# Patient Record
Sex: FEMALE | Race: WHITE | Employment: OTHER | URBAN - METROPOLITAN AREA
[De-identification: names, ages, dates, MRNs, and addresses within clinical notes are randomized per-mention and may not be internally consistent; named-entity substitution may affect disease eponyms.]

---

## 2017-11-17 ENCOUNTER — HOSPITAL ENCOUNTER (OUTPATIENT)
Dept: MAMMOGRAPHY | Age: 62
Discharge: HOME | End: 2017-11-17
Attending: INTERNAL MEDICINE | Admitting: INTERNAL MEDICINE

## 2017-11-17 DIAGNOSIS — Z12.31 VISIT FOR SCREENING MAMMOGRAM: ICD-10-CM

## 2017-11-22 ENCOUNTER — HOSPITAL ENCOUNTER (OUTPATIENT)
Dept: MAMMOGRAPHY | Age: 62
Discharge: HOME | End: 2017-11-22
Attending: INTERNAL MEDICINE | Admitting: INTERNAL MEDICINE

## 2017-11-22 DIAGNOSIS — R92.8 ABNORMAL FINDING ON MAMMOGRAPHY: ICD-10-CM

## 2018-09-27 ENCOUNTER — OFFICE VISIT (OUTPATIENT)
Dept: ORTHOPEDIC SURGERY | Age: 63
End: 2018-09-27
Payer: COMMERCIAL

## 2018-09-27 VITALS
HEART RATE: 70 BPM | BODY MASS INDEX: 17.72 KG/M2 | DIASTOLIC BLOOD PRESSURE: 60 MMHG | SYSTOLIC BLOOD PRESSURE: 110 MMHG | WEIGHT: 100 LBS | HEIGHT: 63 IN

## 2018-09-27 DIAGNOSIS — M65.9 TENOSYNOVITIS OF FOOT: Primary | ICD-10-CM

## 2018-09-27 PROBLEM — R62.7 FAILURE TO THRIVE IN ADULT: Status: ACTIVE | Noted: 2018-05-29

## 2018-09-27 PROBLEM — B37.31 CANDIDIASIS OF VAGINA: Status: ACTIVE | Noted: 2018-09-27

## 2018-09-27 PROBLEM — K60.3 ANAL FISTULA: Status: ACTIVE | Noted: 2018-01-16

## 2018-09-27 PROBLEM — K60.30 ANAL FISTULA: Status: ACTIVE | Noted: 2018-01-16

## 2018-09-27 PROBLEM — M65.979 TENOSYNOVITIS OF FOOT: Status: ACTIVE | Noted: 2018-09-27

## 2018-09-27 PROBLEM — R62.51 FAILURE TO THRIVE (0-17): Status: ACTIVE | Noted: 2018-05-29

## 2018-09-27 PROBLEM — K50.10 CROHN'S DISEASE OF ANAL CANAL (HCC): Status: ACTIVE | Noted: 2018-01-16

## 2018-09-27 PROBLEM — K50.90 REGIONAL ENTERITIS (HCC): Status: ACTIVE | Noted: 2018-09-27

## 2018-09-27 PROCEDURE — L3040 FT ARCH SUPRT PREMOLD LONGIT: HCPCS | Performed by: PODIATRIST

## 2018-09-27 PROCEDURE — 99203 OFFICE O/P NEW LOW 30 MIN: CPT | Performed by: PODIATRIST

## 2018-09-27 RX ORDER — LEVOFLOXACIN 750 MG/1
TABLET ORAL
COMMUNITY

## 2018-09-27 RX ORDER — FLUCONAZOLE 150 MG/1
TABLET ORAL
COMMUNITY

## 2018-09-27 RX ORDER — NAPROXEN SODIUM 220 MG
TABLET ORAL
COMMUNITY
End: 2020-06-15

## 2018-09-27 RX ORDER — CEPHALEXIN 500 MG/1
CAPSULE ORAL
COMMUNITY
End: 2020-06-15

## 2018-09-27 RX ORDER — CLINDAMYCIN HYDROCHLORIDE 300 MG/1
CAPSULE ORAL
Refills: 0 | COMMUNITY
Start: 2018-07-16 | End: 2020-06-15

## 2018-09-27 RX ORDER — FAMCICLOVIR 500 MG/1
TABLET, FILM COATED ORAL
Refills: 1 | COMMUNITY
Start: 2018-07-02 | End: 2020-06-15

## 2018-09-27 RX ORDER — CEFUROXIME AXETIL 500 MG/1
TABLET ORAL
COMMUNITY
End: 2020-06-15

## 2018-09-27 RX ORDER — FLUCONAZOLE 100 MG/1
TABLET ORAL
Refills: 0 | COMMUNITY
Start: 2018-07-16 | End: 2020-06-15

## 2018-09-27 NOTE — PROGRESS NOTES
essentially an overuse type injury secondary to the activity level, foot function, and weight. I discussed the recurrent nature of the episodes of pain and chronic nature of this problem. It was probably the relative increase activity after her surgery that led to the foot symptoms. Both short and long term treatment was discussed. I will see her back as needed. Procedures    Powerstep Protech Full Length Insert     Patient was prescribed Powerstep Protech Full Length Inserts. The bilateral foot will require stabilization / support from this semi-rigid / rigid orthosis to improve their function. The orthosis will assist in protecting the affected area, provide functional support and facilitate healing. The patient was educated and fit by a healthcare professional with expert knowledge and specialization in brace application while under the direct supervision of the treating physician. Verbal and written instructions for the use of and application of this item were provided. They were instructed to contact the office immediately should the brace result in increased pain, decreased sensation, increased swelling or worsening of the condition.

## 2018-11-28 ENCOUNTER — HOSPITAL ENCOUNTER (OUTPATIENT)
Dept: MAMMOGRAPHY | Age: 63
Discharge: HOME | End: 2018-11-28
Payer: COMMERCIAL

## 2018-11-28 DIAGNOSIS — Z12.31 VISIT FOR SCREENING MAMMOGRAM: ICD-10-CM

## 2018-11-28 PROCEDURE — 77067 SCR MAMMO BI INCL CAD: CPT

## 2019-10-28 ENCOUNTER — OFFICE VISIT (OUTPATIENT)
Dept: ORTHOPEDIC SURGERY | Age: 64
End: 2019-10-28
Payer: COMMERCIAL

## 2019-10-28 VITALS — HEIGHT: 63 IN | BODY MASS INDEX: 17.73 KG/M2 | WEIGHT: 100.09 LBS

## 2019-10-28 DIAGNOSIS — M70.61 GREATER TROCHANTERIC BURSITIS OF RIGHT HIP: Primary | ICD-10-CM

## 2019-10-28 DIAGNOSIS — M25.551 HIP PAIN, RIGHT: ICD-10-CM

## 2019-10-28 PROCEDURE — 99213 OFFICE O/P EST LOW 20 MIN: CPT | Performed by: ORTHOPAEDIC SURGERY

## 2019-11-07 ENCOUNTER — HOSPITAL ENCOUNTER (OUTPATIENT)
Dept: PHYSICAL THERAPY | Age: 64
Setting detail: THERAPIES SERIES
Discharge: HOME | End: 2019-11-07
Payer: COMMERCIAL

## 2019-11-07 PROCEDURE — 97161 PT EVAL LOW COMPLEX 20 MIN: CPT | Performed by: PHYSICAL THERAPIST

## 2019-11-07 PROCEDURE — 97140 MANUAL THERAPY 1/> REGIONS: CPT | Performed by: PHYSICAL THERAPIST

## 2019-11-07 PROCEDURE — 97110 THERAPEUTIC EXERCISES: CPT | Performed by: PHYSICAL THERAPIST

## 2019-11-14 ENCOUNTER — HOSPITAL ENCOUNTER (OUTPATIENT)
Dept: PHYSICAL THERAPY | Age: 64
Setting detail: THERAPIES SERIES
Discharge: HOME | End: 2019-11-14
Payer: COMMERCIAL

## 2019-11-14 PROCEDURE — 97110 THERAPEUTIC EXERCISES: CPT | Performed by: PHYSICAL THERAPIST

## 2019-11-14 PROCEDURE — 97140 MANUAL THERAPY 1/> REGIONS: CPT | Performed by: PHYSICAL THERAPIST

## 2019-11-21 ENCOUNTER — HOSPITAL ENCOUNTER (OUTPATIENT)
Dept: PHYSICAL THERAPY | Age: 64
Setting detail: THERAPIES SERIES
Discharge: HOME | End: 2019-11-21
Payer: COMMERCIAL

## 2019-11-21 PROCEDURE — 97140 MANUAL THERAPY 1/> REGIONS: CPT | Performed by: PHYSICAL THERAPIST

## 2019-11-21 PROCEDURE — 97110 THERAPEUTIC EXERCISES: CPT | Performed by: PHYSICAL THERAPIST

## 2019-11-29 ENCOUNTER — HOSPITAL ENCOUNTER (OUTPATIENT)
Dept: MAMMOGRAPHY | Age: 64
Discharge: HOME | End: 2019-11-29
Payer: COMMERCIAL

## 2019-11-29 DIAGNOSIS — Z12.31 VISIT FOR SCREENING MAMMOGRAM: ICD-10-CM

## 2019-11-29 PROCEDURE — 77063 BREAST TOMOSYNTHESIS BI: CPT

## 2019-12-02 ENCOUNTER — HOSPITAL ENCOUNTER (OUTPATIENT)
Dept: PHYSICAL THERAPY | Age: 64
Setting detail: THERAPIES SERIES
Discharge: HOME | End: 2019-12-02
Payer: COMMERCIAL

## 2019-12-02 PROCEDURE — 97140 MANUAL THERAPY 1/> REGIONS: CPT | Performed by: PHYSICAL THERAPIST

## 2019-12-02 PROCEDURE — 97110 THERAPEUTIC EXERCISES: CPT | Performed by: PHYSICAL THERAPIST

## 2020-06-15 ENCOUNTER — OFFICE VISIT (OUTPATIENT)
Dept: ORTHOPEDIC SURGERY | Age: 65
End: 2020-06-15
Payer: MEDICARE

## 2020-06-15 VITALS — WEIGHT: 100.09 LBS | BODY MASS INDEX: 17.73 KG/M2 | RESPIRATION RATE: 15 BRPM | HEIGHT: 63 IN

## 2020-06-15 PROCEDURE — 1036F TOBACCO NON-USER: CPT | Performed by: PODIATRIST

## 2020-06-15 PROCEDURE — G8400 PT W/DXA NO RESULTS DOC: HCPCS | Performed by: PODIATRIST

## 2020-06-15 PROCEDURE — 3017F COLORECTAL CA SCREEN DOC REV: CPT | Performed by: PODIATRIST

## 2020-06-15 PROCEDURE — G8427 DOCREV CUR MEDS BY ELIG CLIN: HCPCS | Performed by: PODIATRIST

## 2020-06-15 PROCEDURE — 4040F PNEUMOC VAC/ADMIN/RCVD: CPT | Performed by: PODIATRIST

## 2020-06-15 PROCEDURE — 1090F PRES/ABSN URINE INCON ASSESS: CPT | Performed by: PODIATRIST

## 2020-06-15 PROCEDURE — 1123F ACP DISCUSS/DSCN MKR DOCD: CPT | Performed by: PODIATRIST

## 2020-06-15 PROCEDURE — 99203 OFFICE O/P NEW LOW 30 MIN: CPT | Performed by: PODIATRIST

## 2020-06-15 PROCEDURE — G8419 CALC BMI OUT NRM PARAM NOF/U: HCPCS | Performed by: PODIATRIST

## 2020-06-30 ENCOUNTER — TELEPHONE (OUTPATIENT)
Dept: ORTHOPEDIC SURGERY | Age: 65
End: 2020-06-30

## 2020-06-30 ENCOUNTER — OFFICE VISIT (OUTPATIENT)
Dept: ORTHOPEDIC SURGERY | Age: 65
End: 2020-06-30
Payer: MEDICARE

## 2020-06-30 VITALS — HEIGHT: 63 IN | BODY MASS INDEX: 18.07 KG/M2 | WEIGHT: 102 LBS

## 2020-06-30 PROCEDURE — L4361 PNEUMA/VAC WALK BOOT PRE OTS: HCPCS | Performed by: PODIATRIST

## 2020-06-30 PROCEDURE — 99213 OFFICE O/P EST LOW 20 MIN: CPT | Performed by: PODIATRIST

## 2020-06-30 RX ORDER — METHYLPREDNISOLONE 4 MG/1
TABLET ORAL
Qty: 1 KIT | Refills: 0 | Status: SHIPPED | OUTPATIENT
Start: 2020-06-30

## 2020-06-30 NOTE — PROGRESS NOTES
HISTORY OF PRESENT ILLNESS:  This is a return visit for a patient with a chief complaint of pain to the side of the right ankle. Unfortunately, she is having more pain now. Within the last week she is increased pain to the point where she is having difficulty sleeping at night and now feels that she is limping. She cannot recall any direct injury. She went golfing once over the last few weeks but otherwise states that she has no point tenderness and decrease her overall activity. PHYSICAL EXAM:  The majority of the palpable tenderness is at the posterior lateral aspect of the right ankle along the peroneal tendons. She has mild erythema overlying the anterior aspect of the ankle and onto the dorsum of the foot. Demonstrates minimal edema however, she states that she has noticed a lot of swelling especially at night. There is full strength with eversion and plantarflexion of the foot, bilateral.    Pedal pulses are palpable, bilateral.  The sensation is grossly intact, bilateral.      ASSESSMENT: Peroneal Tendinitis, right       PLAN: A high tide walker was applied to the patient's right lower extremity. The patient was instructed to remove the boot prior to driving. The boot is to be replaced before the patient if he gets out of the car upon completion of driving. It was recommended that the patient use the boot even at night for sleeping. All weightbearing activity is to be performed in the boot with the exception of driving. Overall activity is to be decreased. I prescribed a Medrol Dosepak. We discussed the potential adverse reactions. I will see her back in 2 weeks. Procedures    Airselect Tall Pneumatic Walking Boot     Patient was prescribed a Sherryn Lever Tall Walking Boot. The right foot will require stabilization / immobilization from this semi-rigid / rigid orthosis to improve their function.   The orthosis will assist in protecting the affected area, provide functional support and

## 2020-07-14 ENCOUNTER — OFFICE VISIT (OUTPATIENT)
Dept: ORTHOPEDIC SURGERY | Age: 65
End: 2020-07-14
Payer: MEDICARE

## 2020-07-14 VITALS — WEIGHT: 102 LBS | BODY MASS INDEX: 18.07 KG/M2 | HEIGHT: 63 IN

## 2020-07-14 PROCEDURE — 3017F COLORECTAL CA SCREEN DOC REV: CPT | Performed by: PODIATRIST

## 2020-07-14 PROCEDURE — 1090F PRES/ABSN URINE INCON ASSESS: CPT | Performed by: PODIATRIST

## 2020-07-14 PROCEDURE — 4040F PNEUMOC VAC/ADMIN/RCVD: CPT | Performed by: PODIATRIST

## 2020-07-14 PROCEDURE — 1123F ACP DISCUSS/DSCN MKR DOCD: CPT | Performed by: PODIATRIST

## 2020-07-14 PROCEDURE — G8419 CALC BMI OUT NRM PARAM NOF/U: HCPCS | Performed by: PODIATRIST

## 2020-07-14 PROCEDURE — G8427 DOCREV CUR MEDS BY ELIG CLIN: HCPCS | Performed by: PODIATRIST

## 2020-07-14 PROCEDURE — 1036F TOBACCO NON-USER: CPT | Performed by: PODIATRIST

## 2020-07-14 PROCEDURE — 99213 OFFICE O/P EST LOW 20 MIN: CPT | Performed by: PODIATRIST

## 2020-07-14 PROCEDURE — G8400 PT W/DXA NO RESULTS DOC: HCPCS | Performed by: PODIATRIST

## 2020-07-14 NOTE — PROGRESS NOTES
HISTORY OF PRESENT ILLNESS:  This is a return visit for a patient with a chief complaint of pain to the side of the right ankle. She states that the boot has helped decrease a lot of her pain however, she still requiring Advil at night to get to sleep. PHYSICAL EXAM:  The majority of the palpable tenderness remains at the posterior lateral aspect of the right ankle along the peroneal tendons. There is no erythema, ecchymosis, or edema. There is full strength with eversion and plantarflexion of the foot, bilateral.    Pedal pulses are palpable, bilateral.  The sensation is grossly intact, bilateral.      ASSESSMENT: Peroneal Tendinitis, right       PLAN: I ordered MRI to rule out any structural problem with the peroneal tendons. In the meantime she will continue with the boot and decrease her overall activity. I will see her back after the MRI.

## 2020-08-04 ENCOUNTER — OFFICE VISIT (OUTPATIENT)
Dept: ORTHOPEDIC SURGERY | Age: 65
End: 2020-08-04
Payer: MEDICARE

## 2020-08-04 PROCEDURE — 4040F PNEUMOC VAC/ADMIN/RCVD: CPT | Performed by: PODIATRIST

## 2020-08-04 PROCEDURE — G8400 PT W/DXA NO RESULTS DOC: HCPCS | Performed by: PODIATRIST

## 2020-08-04 PROCEDURE — G8419 CALC BMI OUT NRM PARAM NOF/U: HCPCS | Performed by: PODIATRIST

## 2020-08-04 PROCEDURE — G8427 DOCREV CUR MEDS BY ELIG CLIN: HCPCS | Performed by: PODIATRIST

## 2020-08-04 PROCEDURE — 3017F COLORECTAL CA SCREEN DOC REV: CPT | Performed by: PODIATRIST

## 2020-08-04 PROCEDURE — 1123F ACP DISCUSS/DSCN MKR DOCD: CPT | Performed by: PODIATRIST

## 2020-08-04 PROCEDURE — 99213 OFFICE O/P EST LOW 20 MIN: CPT | Performed by: PODIATRIST

## 2020-08-04 PROCEDURE — 1036F TOBACCO NON-USER: CPT | Performed by: PODIATRIST

## 2020-08-04 PROCEDURE — 1090F PRES/ABSN URINE INCON ASSESS: CPT | Performed by: PODIATRIST

## 2020-08-18 ENCOUNTER — OFFICE VISIT (OUTPATIENT)
Dept: ORTHOPEDIC SURGERY | Age: 65
End: 2020-08-18
Payer: MEDICARE

## 2020-08-18 VITALS — WEIGHT: 102 LBS | HEIGHT: 63 IN | BODY MASS INDEX: 18.07 KG/M2

## 2020-08-18 PROBLEM — M17.12 PATELLOFEMORAL ARTHRITIS OF LEFT KNEE: Status: ACTIVE | Noted: 2020-08-18

## 2020-08-18 PROBLEM — S83.242A ACUTE MEDIAL MENISCUS TEAR OF LEFT KNEE: Status: ACTIVE | Noted: 2020-08-18

## 2020-08-18 PROCEDURE — G8427 DOCREV CUR MEDS BY ELIG CLIN: HCPCS | Performed by: ORTHOPAEDIC SURGERY

## 2020-08-18 PROCEDURE — G8400 PT W/DXA NO RESULTS DOC: HCPCS | Performed by: ORTHOPAEDIC SURGERY

## 2020-08-18 PROCEDURE — 3017F COLORECTAL CA SCREEN DOC REV: CPT | Performed by: ORTHOPAEDIC SURGERY

## 2020-08-18 PROCEDURE — 1123F ACP DISCUSS/DSCN MKR DOCD: CPT | Performed by: ORTHOPAEDIC SURGERY

## 2020-08-18 PROCEDURE — 1036F TOBACCO NON-USER: CPT | Performed by: ORTHOPAEDIC SURGERY

## 2020-08-18 PROCEDURE — 1090F PRES/ABSN URINE INCON ASSESS: CPT | Performed by: ORTHOPAEDIC SURGERY

## 2020-08-18 PROCEDURE — 99203 OFFICE O/P NEW LOW 30 MIN: CPT | Performed by: ORTHOPAEDIC SURGERY

## 2020-08-18 PROCEDURE — 4040F PNEUMOC VAC/ADMIN/RCVD: CPT | Performed by: ORTHOPAEDIC SURGERY

## 2020-08-18 PROCEDURE — G8419 CALC BMI OUT NRM PARAM NOF/U: HCPCS | Performed by: ORTHOPAEDIC SURGERY

## 2020-08-18 PROCEDURE — 20610 DRAIN/INJ JOINT/BURSA W/O US: CPT | Performed by: ORTHOPAEDIC SURGERY

## 2020-08-18 RX ORDER — BETAMETHASONE SODIUM PHOSPHATE AND BETAMETHASONE ACETATE 3; 3 MG/ML; MG/ML
12 INJECTION, SUSPENSION INTRA-ARTICULAR; INTRALESIONAL; INTRAMUSCULAR; SOFT TISSUE ONCE
Status: CANCELLED | OUTPATIENT
Start: 2020-08-18 | End: 2020-08-18

## 2020-08-18 RX ORDER — BUPIVACAINE HYDROCHLORIDE 2.5 MG/ML
30 INJECTION, SOLUTION INFILTRATION; PERINEURAL ONCE
Status: COMPLETED | OUTPATIENT
Start: 2020-08-18 | End: 2020-08-18

## 2020-08-18 RX ORDER — BETAMETHASONE SODIUM PHOSPHATE AND BETAMETHASONE ACETATE 3; 3 MG/ML; MG/ML
12 INJECTION, SUSPENSION INTRA-ARTICULAR; INTRALESIONAL; INTRAMUSCULAR; SOFT TISSUE ONCE
Status: COMPLETED | OUTPATIENT
Start: 2020-08-18 | End: 2020-08-18

## 2020-08-18 RX ADMIN — BETAMETHASONE SODIUM PHOSPHATE AND BETAMETHASONE ACETATE 12 MG: 3; 3 INJECTION, SUSPENSION INTRA-ARTICULAR; INTRALESIONAL; INTRAMUSCULAR; SOFT TISSUE at 15:48

## 2020-08-18 RX ADMIN — BUPIVACAINE HYDROCHLORIDE 75 MG: 2.5 INJECTION, SOLUTION INFILTRATION; PERINEURAL at 15:48

## 2020-08-18 NOTE — PROGRESS NOTES
Chief Complaint    Knee Pain (left knee)      History of Present Illness:  Mayela Lugo is a 72 y.o. female. She has been playing tennis on and off but recently had a stress fracture in her right foot and came out of her boot 3 weeks ago. She started easing into tennis and played about 4 times since that time. And was also playing golf. She developed sudden onset of medial pain in the knee. It was dull but sometimes occasionally sharp. Pain level was 3/10. Is fairly constant. Stretching and straightening and walking and stairs increases her pain. Ice and rest decreases the pain. No giving way. Pain Assessment  Location of Pain: Knee  Location Modifiers: Left, Medial  Severity of Pain: 3  Quality of Pain: Dull  Duration of Pain: A few days  Frequency of Pain: Constant  Date Pain First Started: 08/17/20  Aggravating Factors: Stretching, Straightening, Walking, Stairs, Standing, Exercise, Bending  Relieving Factors: Ice  Result of Injury: No  Work-Related Injury: No    Medical History:  Patient's medications, allergies, past medical, surgical, social and family histories were reviewed and updated as appropriate. Review of Systems:  Pertinent items are noted in HPI  Review of systems reviewed from Patient History Form dated on August 18, 2020 and available in the patient's chart under the Media tab. Vital Signs:  Ht 5' 3\" (1.6 m)   Wt 102 lb (46.3 kg)   BMI 18.07 kg/m²     General Exam:   Constitutional: Patient is adequately groomed with no evidence of malnutrition  Mental Status: The patient is oriented to time, place and person. The patient's mood and affect are appropriate. Lymphatic: The lymphatic examination bilaterally reveals all areas to be without enlargement or induration. Knee Examination:    Inspection: Trace effusion left knee    Palpation: Ligamentous stability is good with negative Lockman, drawer, pivot shift and posterior drawer.   Good medial lateral stability at 0 and 30 degrees. She has significant retropatellar tenderness medial more than lateral facet with grade 2+ patellofemoral crepitus. No lateral joint line tenderness. She does have some medial joint line tenderness but increased pain with Chanda sign. Range of Motion: -5 to 125 degrees    Strength: Quadricep strength 3+/5 VMO    Special Tests: Negative Homans sign left    Skin: There are no rashes, ulcerations or lesions. Gait: Mild antalgic gait left    Reflex intact    Additional Comments:       Additional Examinations:         Contralateral Exam: Examination of the contralateral knee reveals warm skin, range of motion within normal limits, good quadriceps bulk, tone and strength, no tenderness to palpation, stable cruciate and collateral ligaments, and no joint line tenderness. Left Lower Extremity: Examination of the left lower extremity does not show any tenderness, deformity or injury. Range of motion is unremarkable. There is no gross instability. There are no rashes, ulcerations or lesions. Strength and tone are normal.    Radiology:     X-rays obtained and reviewed in office:  Views standing AP, PA, lateral and sunrise  Location left knee  Impression mild medial compartment narrowing with mild patellofemoral spurring. Assessment : Primary osteoarthritis affecting patellofemoral joint with concern for medial meniscus tear left knee    Impression:  Encounter Diagnoses   Name Primary?  Acute pain of left knee Yes    Acute medial meniscus tear of left knee, initial encounter     Patellofemoral arthritis of left knee        Office Procedures:  Orders Placed This Encounter   Procedures    XR KNEE LEFT (MIN 4 VIEWS)     Standing Status:   Future     Number of Occurrences:   1     Standing Expiration Date:   8/18/2021       Treatment Plan: Because of her discomfort we recommend an injection of cortisone and Marcaine. She understood the risks and accepted it.   Were going to obtain an MRI scan to evaluate medial meniscus integrity. She is cautioned against overuse. I discussed in detail the risks, benefits and complications of an injection which included but are not limited to infection, skin reactions, hot swollen joint, and anaphylaxis with the patient. The patient verbalized understanding and gave informed consent for the injection. The patient's left knee was extended to zero degrees and the skin at the supra-lateral pouch was prepped using sterile alcohol solution. A sterile 22-gauge needle was inserted into the knee and the mixture of, 1.5 mL of 0.25% Marcaine, and 2 mg of Betamethasone was injected under sterile technique. The needle was withdrawn and the puncture site sealed with a Band-Aid. The patient tolerated the injection well. The patient was instructed to call the office immediately if there is any pain, redness, warmth, fever, or chills.

## 2020-08-25 ENCOUNTER — OFFICE VISIT (OUTPATIENT)
Dept: ORTHOPEDIC SURGERY | Age: 65
End: 2020-08-25
Payer: MEDICARE

## 2020-08-25 VITALS — BODY MASS INDEX: 18.07 KG/M2 | HEIGHT: 63 IN | WEIGHT: 102 LBS

## 2020-08-25 PROBLEM — M23.262 DERANGEMENT OF OTHER LATERAL MENISCUS DUE TO OLD TEAR OR INJURY, LEFT KNEE: Chronic | Status: ACTIVE | Noted: 2020-08-25

## 2020-08-25 PROBLEM — M23.262 DERANGEMENT OF OTHER LATERAL MENISCUS DUE TO OLD TEAR OR INJURY, LEFT KNEE: Status: ACTIVE | Noted: 2020-08-25

## 2020-08-25 PROCEDURE — 20610 DRAIN/INJ JOINT/BURSA W/O US: CPT | Performed by: ORTHOPAEDIC SURGERY

## 2020-08-25 RX ORDER — HYALURONATE SODIUM 10 MG/ML
20 SYRINGE (ML) INTRAARTICULAR ONCE
Status: COMPLETED | OUTPATIENT
Start: 2020-08-25 | End: 2020-08-25

## 2020-08-25 RX ADMIN — Medication 20 MG: at 13:59

## 2020-08-25 NOTE — PROGRESS NOTES
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 There is patellofemoral and lateral compartment arthropathy with intermediate to high-grade arthritis midline trochlea. Subchondral pseudocyst.  Mild marrow reaction. Capsulitis. Medial meniscus was intact. We talked her about the options of treatment and at this point I recommended to leave the lateral meniscus go since it is not clinically correlated both on physical exam or history. At this point I recommended going ahead with Visco supplementation with Euflexxa. She understood the risks and accepted it. The patient returns today for their first Euflexxa injection to the left knee for diagnosis of osteoarthritis. . The risks, benefits, and complications of the injections were again discussed in detail with the patient. The risks discussed included but are not limited to infection, skin reactions, hot swollen joints, and anaphylaxis. The patient gave verbal informed consent for the injection. The patient skin was prepped with iodine and sterile 4x4 gauze pad and the left knee joint was injected with 2 ml of Euflexxa intra-articularly under sterile conditions  into the supra-lateral pouch. The patient tolerated the injection reasonably well. The patient was given instructions to ice the right knee and avoid strenuous activities for 24-48 hours. The patient was instructed to call the office immediately if there is increased pain, redness, warmth, fever, or chills. We will see the patient back in [one week] for their second injection.

## 2020-09-01 ENCOUNTER — OFFICE VISIT (OUTPATIENT)
Dept: ORTHOPEDIC SURGERY | Age: 65
End: 2020-09-01
Payer: MEDICARE

## 2020-09-01 PROCEDURE — 20610 DRAIN/INJ JOINT/BURSA W/O US: CPT | Performed by: ORTHOPAEDIC SURGERY

## 2020-09-01 RX ORDER — HYALURONATE SODIUM 10 MG/ML
20 SYRINGE (ML) INTRAARTICULAR ONCE
Status: COMPLETED | OUTPATIENT
Start: 2020-09-01 | End: 2020-09-01

## 2020-09-01 RX ADMIN — Medication 20 MG: at 13:43

## 2020-09-01 NOTE — PROGRESS NOTES
The patient returns today for their second Euflexxa injection to the left knee for diagnosis of osteoarthritis. . The risks, benefits, and complications of the injections were again discussed in detail with the patient. The risks discussed included but are not limited to infection, skin reactions, hot swollen joints, and anaphylaxis. The patient gave verbal informed consent for the injection. The patient skin was prepped with iodine and sterile 4x4 gauze pad and the left knee joint was injected with 2 ml of Euflexxa intra-articularly under sterile conditions  into the supra-lateral pouch. The patient tolerated the injection reasonably well. The patient was given instructions to ice the left knee and avoid strenuous activities for 24-48 hours. The patient was instructed to call the office immediately if there is increased pain, redness, warmth, fever, or chills. We will see the patient back in [one week] for their third injection.

## 2020-09-15 ENCOUNTER — OFFICE VISIT (OUTPATIENT)
Dept: ORTHOPEDIC SURGERY | Age: 65
End: 2020-09-15
Payer: MEDICARE

## 2020-09-15 VITALS — HEIGHT: 63 IN | BODY MASS INDEX: 18.07 KG/M2 | WEIGHT: 102 LBS

## 2020-09-15 PROCEDURE — 20610 DRAIN/INJ JOINT/BURSA W/O US: CPT | Performed by: ORTHOPAEDIC SURGERY

## 2020-09-15 RX ORDER — HYALURONATE SODIUM 10 MG/ML
20 SYRINGE (ML) INTRAARTICULAR ONCE
Status: COMPLETED | OUTPATIENT
Start: 2020-09-15 | End: 2020-09-15

## 2020-09-15 RX ADMIN — Medication 20 MG: at 14:08

## 2021-01-08 ENCOUNTER — HOSPITAL ENCOUNTER (OUTPATIENT)
Dept: MAMMOGRAPHY | Age: 66
Discharge: HOME | End: 2021-01-08
Payer: MEDICARE

## 2021-01-08 DIAGNOSIS — Z12.31 VISIT FOR SCREENING MAMMOGRAM: ICD-10-CM

## 2021-01-08 PROCEDURE — 77063 BREAST TOMOSYNTHESIS BI: CPT

## 2022-01-19 ENCOUNTER — HOSPITAL ENCOUNTER (OUTPATIENT)
Dept: MAMMOGRAPHY | Age: 67
Discharge: HOME | End: 2022-01-19
Payer: MEDICARE

## 2022-01-19 VITALS — WEIGHT: 100 LBS | HEIGHT: 63 IN | BODY MASS INDEX: 17.72 KG/M2

## 2022-01-19 DIAGNOSIS — Z12.31 VISIT FOR SCREENING MAMMOGRAM: ICD-10-CM

## 2022-01-19 PROCEDURE — 77067 SCR MAMMO BI INCL CAD: CPT

## 2022-12-29 ENCOUNTER — HOSPITAL ENCOUNTER (EMERGENCY)
Age: 67
Discharge: HOME | End: 2022-12-29

## 2023-01-25 ENCOUNTER — HOSPITAL ENCOUNTER (OUTPATIENT)
Dept: MAMMOGRAPHY | Age: 68
Discharge: HOME | End: 2023-01-25
Payer: MEDICARE

## 2023-01-25 VITALS — HEIGHT: 63 IN | BODY MASS INDEX: 17.72 KG/M2 | WEIGHT: 100 LBS

## 2023-01-25 DIAGNOSIS — Z12.31 VISIT FOR SCREENING MAMMOGRAM: ICD-10-CM

## 2023-01-25 PROCEDURE — 77067 SCR MAMMO BI INCL CAD: CPT

## 2024-01-29 ENCOUNTER — HOSPITAL ENCOUNTER (OUTPATIENT)
Dept: MAMMOGRAPHY | Age: 69
Discharge: HOME | End: 2024-01-29
Payer: MEDICARE

## 2024-01-29 VITALS — WEIGHT: 102 LBS | BODY MASS INDEX: 18.77 KG/M2 | HEIGHT: 62 IN

## 2024-01-29 DIAGNOSIS — Z12.31 VISIT FOR SCREENING MAMMOGRAM: ICD-10-CM

## 2024-01-29 PROCEDURE — 77063 BREAST TOMOSYNTHESIS BI: CPT

## 2024-10-14 ENCOUNTER — TELEPHONE (OUTPATIENT)
Dept: ORTHOPEDIC SURGERY | Age: 69
End: 2024-10-14

## 2024-10-14 NOTE — TELEPHONE ENCOUNTER
General Question     Subject: REQUEST CALL BACK FROM JERRICA  Patient and /or Facility Request: Deb Alford   Contact Number: [phone]     PATIENT CALLED TO REQUEST CALL FROM LASHA BECERRIL TO MAKE SURE HE OR DR. JEFFRIES WOULD BE A GOOD FIT FOR HER   PLEASE CALL PATIENT